# Patient Record
Sex: MALE | Race: WHITE | NOT HISPANIC OR LATINO | Employment: FULL TIME | URBAN - METROPOLITAN AREA
[De-identification: names, ages, dates, MRNs, and addresses within clinical notes are randomized per-mention and may not be internally consistent; named-entity substitution may affect disease eponyms.]

---

## 2022-09-02 ENCOUNTER — TELEPHONE (OUTPATIENT)
Dept: CARDIOLOGY | Facility: MEDICAL CENTER | Age: 62
End: 2022-09-02
Payer: COMMERCIAL

## 2022-09-09 ENCOUNTER — OFFICE VISIT (OUTPATIENT)
Dept: CARDIOLOGY | Facility: MEDICAL CENTER | Age: 62
End: 2022-09-09
Payer: COMMERCIAL

## 2022-09-09 VITALS
BODY MASS INDEX: 30.24 KG/M2 | RESPIRATION RATE: 16 BRPM | WEIGHT: 216 LBS | HEIGHT: 71 IN | OXYGEN SATURATION: 96 % | SYSTOLIC BLOOD PRESSURE: 110 MMHG | DIASTOLIC BLOOD PRESSURE: 70 MMHG | HEART RATE: 59 BPM

## 2022-09-09 DIAGNOSIS — U07.1 COVID-19: ICD-10-CM

## 2022-09-09 DIAGNOSIS — R06.02 SHORTNESS OF BREATH: ICD-10-CM

## 2022-09-09 DIAGNOSIS — E78.5 DYSLIPIDEMIA: ICD-10-CM

## 2022-09-09 DIAGNOSIS — R94.31 NONSPECIFIC ABNORMAL ELECTROCARDIOGRAM (ECG) (EKG): ICD-10-CM

## 2022-09-09 DIAGNOSIS — I20.9 ANGINA PECTORIS (HCC): ICD-10-CM

## 2022-09-09 LAB — EKG IMPRESSION: NORMAL

## 2022-09-09 PROCEDURE — 93000 ELECTROCARDIOGRAM COMPLETE: CPT | Performed by: STUDENT IN AN ORGANIZED HEALTH CARE EDUCATION/TRAINING PROGRAM

## 2022-09-09 PROCEDURE — 99204 OFFICE O/P NEW MOD 45 MIN: CPT | Performed by: STUDENT IN AN ORGANIZED HEALTH CARE EDUCATION/TRAINING PROGRAM

## 2022-09-09 RX ORDER — OMEPRAZOLE 20 MG/1
20 CAPSULE, DELAYED RELEASE ORAL DAILY
COMMUNITY

## 2022-09-09 RX ORDER — AMOXICILLIN AND CLAVULANATE POTASSIUM 875; 125 MG/1; MG/1
1 TABLET, FILM COATED ORAL 2 TIMES DAILY
COMMUNITY
Start: 2022-07-29

## 2022-09-09 RX ORDER — POLYMYXIN B SULFATE AND TRIMETHOPRIM 1; 10000 MG/ML; [USP'U]/ML
SOLUTION OPHTHALMIC
COMMUNITY
Start: 2022-08-19

## 2022-09-09 ASSESSMENT — ENCOUNTER SYMPTOMS
FOCAL WEAKNESS: 0
DIARRHEA: 0
NIGHT SWEATS: 0
DIZZINESS: 0
ABDOMINAL PAIN: 0
ORTHOPNEA: 0
WEAKNESS: 0
SYNCOPE: 0
COUGH: 0
NEAR-SYNCOPE: 0
PND: 0
DYSPNEA ON EXERTION: 0
PALPITATIONS: 0
FEVER: 0
SHORTNESS OF BREATH: 0
WHEEZING: 0
IRREGULAR HEARTBEAT: 0
NAUSEA: 0
VOMITING: 0

## 2022-09-09 NOTE — PROGRESS NOTES
Cardiology Initial Consultation Note    Date of note:    9/9/2022    Primary Care Provider: Mil Emerson M.D.  Referring Provider: Mil Emerson M.D.     Patient Name: Jeferson Beth   YOB: 1960  MRN:              [id number]    Chief Complaint: Chest pain and shortness of breath    History of Present Illness: Mr. Jeferson Beth is a 62 y.o. male with no prior cardiac history and history of COVID 10/2022 who is here for cardiac consultation for chest pain and shortness of breath.    The patient was evaluated by primary care physician on 6/22/2022.  Due to complaints of chest pain, the patient was referred to cardiology for further work-up.    The patient presents today to discuss symptoms.  The patient presents with his wife and 8-month-old grandson.  Since his COVID infection, he has been having chest pain and shortness of breath. By the afternoon time, he feels fatigued at work (he works as a ).  The patient reports that slowly over the last few months, he has been feeling better in terms of symptoms.  He denies any orthopnea, PND, or leg swelling.  No palpitations.  No syncope or presyncopal episodes.    Cardiovascular Risk Factors:  1. Smoking status: Never smoker  2. Type II Diabetes Mellitus: None/not recently checked  3. Hypertension: None  4. Dyslipidemia: None/not recently checked  5. Family history of early Coronary Artery Disease in a first degree relative (Male less than 55 years of age; Female less than 65 years of age): None  6.  Obesity and/or Metabolic Syndrome: Body mass index is 30.13 kg/m².  7. Sedentary lifestyle: Active ()    Review of Systems   Constitutional: Negative for fever, malaise/fatigue and night sweats.   Cardiovascular:  Negative for chest pain, dyspnea on exertion, irregular heartbeat, leg swelling, near-syncope, orthopnea, palpitations, paroxysmal nocturnal dyspnea and syncope.   Respiratory:  Negative for cough, shortness of breath and  "wheezing.    Gastrointestinal:  Negative for abdominal pain, diarrhea, nausea and vomiting.   Neurological:  Negative for dizziness, focal weakness and weakness.       All other systems reviewed and are negative.       Current Outpatient Medications   Medication Sig Dispense Refill    omeprazole (PRILOSEC) 20 MG delayed-release capsule Take 20 mg by mouth every day.      Aspirin 325 MG Cap Take  by mouth.      amoxicillin-clavulanate (AUGMENTIN) 875-125 MG Tab Take 1 Tablet by mouth 2 times a day.      polymixin-trimethoprim (POLYTRIM) 27462-5.1 UNIT/ML-% Solution        No current facility-administered medications for this visit.         Allergies   Allergen Reactions    Nkda [No Known Drug Allergy]          History reviewed. No pertinent family history.    Physical Exam:  Ambulatory Vitals  /70 (BP Location: Left arm, Patient Position: Sitting, BP Cuff Size: Adult)   Pulse (!) 59   Resp 16   Ht 1.803 m (5' 11\")   Wt 98 kg (216 lb)   SpO2 96%    Oxygen Therapy:  Pulse Oximetry: 96 %  BP Readings from Last 4 Encounters:   09/09/22 110/70   12/23/12 133/84       Weight/BMI: Body mass index is 30.13 kg/m².  Wt Readings from Last 4 Encounters:   09/09/22 98 kg (216 lb)   12/23/12 90.7 kg (200 lb)       General: Well appearing and in no apparent distress  Eyes: nl conjunctiva, no icteric sclera  ENT: wearing a mask, normal external appearance of ears  Neck: no visible JVP,  no carotid bruits  Lungs: normal respiratory effort, CTAB  Heart: RRR, no murmurs, no rubs or gallops,  no edema bilateral lower extremities. No LV/RV heave on cardiac palpatation. + bilateral radial pulses.  + bilateral dp pulses.   Abdomen: soft, non tender, non distended, no masses, normal bowel sounds.  No HSM.  Extremities/MSK: no clubbing, no cyanosis  Neurological: No focal sensory deficits  Psychiatric: Appropriate affect, A/O x 3, intact judgement and insight  Skin: Warm extremities      Lab Data Review:  No results found for: " CHOLSTRLTOT, LDL, HDL, TRIGLYCERIDE    No results found for: SODIUM, POTASSIUM, CHLORIDE, CO2, GLUCOSE, BUN, CREATININE, BUNCREATRAT, GLOMRATE  No results found for: ALKPHOSPHAT, ASTSGOT, ALTSGPT, TBILIRUBIN   No results found for: WBC  No results found for: HBA1C      Cardiac Imaging and Procedures Review:    EKG dated 9/9/2022: My personal interpretation is sinus bradycardia, low voltage  In extremity leads, poor R wave progression    No prior echocardiogram    Assessment & Plan     1. Angina pectoris (HCC)  EKG    [id number] PET W/CT ATTENUATION    EC-ECHOCARDIOGRAM COMPLETE W/O CONT      2. COVID-19  EKG    EC-ECHOCARDIOGRAM COMPLETE W/O CONT      3. Nonspecific abnormal electrocardiogram (ECG) (EKG)  EC-ECHOCARDIOGRAM COMPLETE W/O CONT      4. Shortness of breath  EKG            Shared Medical Decision Making:    Chest pain  Shortness of breath  -We will plan for cardiac PET to assess for any prior MI or reversible ischemia given symptoms as well as personal risk factors for cardiovascular disease.  Cardiac PET is ordered for diagnostic accuracy and low radiation dose for the patient.    Prior COVID infection  Abnormal EKG  -We will plan for echocardiogram given prior COVID infection and abnormal EKG to rule out any structural abnormalities of the heart.    Dyslipidemia  -Obtain labs from Dr. Emerson.  Reportedly had high total cholesterol but otherwise normal.  -Patient would like to work on diet first, will obtain records and calculate ASCVD.  If ASCVD risk high, will need to initiate statin.    All of the patient's excellent questions were answered to the best of my knowledge and to his satisfaction.  It was a pleasure seeing Mr. Jeferson Beth in my clinic today. Return in about 3 months (around 12/9/2022). Patient is aware to call the cardiology clinic with any questions or concerns.      Alma Mendez MD  [facility]  [facility]  [address]  [place] [address]  [place] [address]  Phone: [phone]  Fax: [phone]

## 2022-10-11 ENCOUNTER — TELEPHONE (OUTPATIENT)
Dept: CARDIOLOGY | Facility: MEDICAL CENTER | Age: 62
End: 2022-10-11
Payer: COMMERCIAL

## 2022-10-11 DIAGNOSIS — I20.9 ANGINA PECTORIS (HCC): ICD-10-CM

## 2022-10-11 DIAGNOSIS — R06.02 SHORTNESS OF BREATH: ICD-10-CM

## 2022-10-11 DIAGNOSIS — U07.1 COVID-19: ICD-10-CM

## 2022-10-11 DIAGNOSIS — R94.31 NONSPECIFIC ABNORMAL ELECTROCARDIOGRAM (ECG) (EKG): ICD-10-CM

## 2022-10-11 NOTE — TELEPHONE ENCOUNTER
Reviewed the denial letter in media from [facility] which is detailed in explaining why NM cardiac PET was denied. Per the letter, an abnormal echo may increase likelihood of coverage. See below. Pt is scheduled for echo 1/13.         If wanting to initiate an appeal here are the following phone numbers:   Phone number [phone], [phone]    To Dr. Mendez: should pt wait for echo results first, per insurance guidelines?

## 2022-10-11 NOTE — TELEPHONE ENCOUNTER
HK        Caller: [employer] [employer] [employer]    Topic/issue:  There was a denial letter for the Nuc med ref that was received. She was calling to see what would be the next step would be if it should be appealed or wait until the echo is completed in January next year    Callback Number: [phone]    Thank you    -Kedar GRANT

## 2022-10-12 NOTE — TELEPHONE ENCOUNTER
Alma Mendez M.D.  You 1 hour ago (8:34 AM)     Yes can do echo first, if you could change it to stat. Thank you!    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     Explained denial of stress test, she was aware, and the plan to schedule echo sooner. Provided wife the contact number for [facility] scheduling to arrange.     STAT echo order placed.

## 2022-12-02 ENCOUNTER — HOSPITAL ENCOUNTER (OUTPATIENT)
Dept: CARDIOLOGY | Facility: MEDICAL CENTER | Age: 62
End: 2022-12-02
Attending: STUDENT IN AN ORGANIZED HEALTH CARE EDUCATION/TRAINING PROGRAM
Payer: COMMERCIAL

## 2022-12-02 DIAGNOSIS — R94.31 NONSPECIFIC ABNORMAL ELECTROCARDIOGRAM (ECG) (EKG): ICD-10-CM

## 2022-12-02 DIAGNOSIS — R06.02 SHORTNESS OF BREATH: ICD-10-CM

## 2022-12-02 DIAGNOSIS — I20.9 ANGINA PECTORIS (HCC): ICD-10-CM

## 2022-12-02 DIAGNOSIS — U07.1 COVID-19: ICD-10-CM

## 2022-12-02 LAB — LV EJECT FRACT MOD 4C 99902: 73.6

## 2022-12-02 PROCEDURE — 93306 TTE W/DOPPLER COMPLETE: CPT | Mod: 26 | Performed by: STUDENT IN AN ORGANIZED HEALTH CARE EDUCATION/TRAINING PROGRAM

## 2022-12-02 PROCEDURE — 93306 TTE W/DOPPLER COMPLETE: CPT

## 2022-12-05 ENCOUNTER — TELEPHONE (OUTPATIENT)
Dept: CARDIOLOGY | Facility: MEDICAL CENTER | Age: 62
End: 2022-12-05
Payer: COMMERCIAL

## 2022-12-05 NOTE — TELEPHONE ENCOUNTER
----- Message from Consuelo Gutierrez R.N. sent at 12/5/2022  2:30 PM PST -----    ----- Message -----  From: Alma Mendez M.D.  Sent: 12/2/2022  10:25 AM PST  To: Consuelo Gutierrez R.N.    Hi,     Could you let the patient know his echo looks good - normal heart function.  He does have minor abnormalities such as mild leaky valve (aortic) and mildly dilated vessel (ascending aorta), which we'll need to monitor every 1-2 years via echocardiogram. Thank you!!    (He doesn't have MyChart!)    -HK

## 2022-12-05 NOTE — TELEPHONE ENCOUNTER
Patient called, [phone], and results given to patient's wife, Michelle. All questions/concerns answered at this time, patient's wife appreciative of information given and will pass on to patient.

## 2024-08-12 ENCOUNTER — HOSPITAL ENCOUNTER (OUTPATIENT)
Dept: LAB | Facility: MEDICAL CENTER | Age: 64
End: 2024-08-12
Attending: GENERAL PRACTICE
Payer: COMMERCIAL

## 2024-08-12 LAB
ALBUMIN SERPL BCP-MCNC: 4.3 G/DL (ref 3.2–4.9)
ALBUMIN/GLOB SERPL: 1.4 G/DL
ALP SERPL-CCNC: 40 U/L (ref 30–99)
ALT SERPL-CCNC: 31 U/L (ref 2–50)
ANION GAP SERPL CALC-SCNC: 12 MMOL/L (ref 7–16)
AST SERPL-CCNC: 25 U/L (ref 12–45)
BILIRUB SERPL-MCNC: 0.5 MG/DL (ref 0.1–1.5)
BUN SERPL-MCNC: 24 MG/DL (ref 8–22)
CALCIUM ALBUM COR SERPL-MCNC: 9 MG/DL (ref 8.5–10.5)
CALCIUM SERPL-MCNC: 9.2 MG/DL (ref 8.4–10.2)
CHLORIDE SERPL-SCNC: 108 MMOL/L (ref 96–112)
CHOLEST SERPL-MCNC: 256 MG/DL (ref 100–199)
CO2 SERPL-SCNC: 21 MMOL/L (ref 20–33)
CREAT SERPL-MCNC: 1.05 MG/DL (ref 0.5–1.4)
FASTING STATUS PATIENT QL REPORTED: NORMAL
GFR SERPLBLD CREATININE-BSD FMLA CKD-EPI: 79 ML/MIN/1.73 M 2
GLOBULIN SER CALC-MCNC: 3 G/DL (ref 1.9–3.5)
GLUCOSE SERPL-MCNC: 98 MG/DL (ref 65–99)
HDLC SERPL-MCNC: 55 MG/DL
LDLC SERPL CALC-MCNC: 179 MG/DL
POTASSIUM SERPL-SCNC: 4 MMOL/L (ref 3.6–5.5)
PROT SERPL-MCNC: 7.3 G/DL (ref 6–8.2)
PSA SERPL-MCNC: 0.56 NG/ML (ref 0–4)
SODIUM SERPL-SCNC: 141 MMOL/L (ref 135–145)
TRIGL SERPL-MCNC: 111 MG/DL (ref 0–149)

## 2024-08-12 PROCEDURE — 36415 COLL VENOUS BLD VENIPUNCTURE: CPT

## 2024-08-12 PROCEDURE — 84153 ASSAY OF PSA TOTAL: CPT

## 2024-08-12 PROCEDURE — 80053 COMPREHEN METABOLIC PANEL: CPT

## 2024-08-12 PROCEDURE — 80061 LIPID PANEL: CPT

## 2024-08-14 PROBLEM — G56.02 LEFT CARPAL TUNNEL SYNDROME: Status: ACTIVE | Noted: 2024-08-12

## 2024-08-21 ENCOUNTER — OFFICE VISIT (OUTPATIENT)
Dept: URGENT CARE | Facility: CLINIC | Age: 64
End: 2024-08-21
Payer: COMMERCIAL

## 2024-08-21 VITALS
RESPIRATION RATE: 19 BRPM | SYSTOLIC BLOOD PRESSURE: 126 MMHG | OXYGEN SATURATION: 99 % | TEMPERATURE: 98 F | DIASTOLIC BLOOD PRESSURE: 84 MMHG | HEART RATE: 66 BPM | BODY MASS INDEX: 29.4 KG/M2 | HEIGHT: 71 IN | WEIGHT: 210 LBS

## 2024-08-21 DIAGNOSIS — H61.23 BILATERAL HEARING LOSS DUE TO CERUMEN IMPACTION: ICD-10-CM

## 2024-08-21 PROCEDURE — 69210 REMOVE IMPACTED EAR WAX UNI: CPT

## 2024-08-21 ASSESSMENT — ENCOUNTER SYMPTOMS
EYE PAIN: 0
DIZZINESS: 0
FEVER: 0
ABDOMINAL PAIN: 0
MYALGIAS: 0
CHILLS: 0
SPUTUM PRODUCTION: 0
NAUSEA: 0
EYE DISCHARGE: 0
COUGH: 0
DIARRHEA: 0
SORE THROAT: 0
WHEEZING: 0
SHORTNESS OF BREATH: 0
EYE REDNESS: 0
VOMITING: 0
STRIDOR: 0
HEADACHES: 0
PALPITATIONS: 0

## 2024-08-21 NOTE — PROGRESS NOTES
Subjective:   Jeferson Beth is a 64 y.o. male who presents for Ear Fullness (wax)          I introduced myself to the patient and informed them that I am a Family Nurse Practitioner.    HPI:Jeferson is a 64-year-old male who comes in today c/o bilateral ear discomfort, scant drainage, and  diminished hearing. Onset was 7 days ago.  Patient describes symptoms as constant. They describe the pain as mild aching. Aggravating factors include touching the ear, lying on that side. Relieving factors include none. Treatments tried at home include none.  They describe their symptoms as moderate.  They deny any other viral type symptoms.  They deny fever, chills, nausea or vomiting, lethargy, mastoid pain or swelling.  Patient states he went to see an audiologist to get fitted for hearing aids, they told him to present to PCP or urgent care for ear lavage to clear out the wax before they could proceed with any further evaluation or treatment with hearing aids.    Review of Systems   Constitutional:  Negative for chills, fever and malaise/fatigue.   HENT:  Positive for hearing loss. Negative for congestion, ear pain and sore throat.    Eyes:  Negative for pain, discharge and redness.   Respiratory:  Negative for cough, sputum production, shortness of breath, wheezing and stridor.    Cardiovascular:  Negative for chest pain and palpitations.   Gastrointestinal:  Negative for abdominal pain, diarrhea, nausea and vomiting.   Genitourinary:  Negative for dysuria.   Musculoskeletal:  Negative for myalgias.   Skin:  Negative for rash.   Neurological:  Negative for dizziness and headaches.       Medications: amoxicillin-clavulanate Tabs  Aspirin Caps  ibuprofen Tabs  omeprazole  polymixin-trimethoprim Soln     Allergies: Nkda [no known drug allergy]    Problem List: does not have any pertinent problems on file.    Surgical History:  Past Surgical History:   Procedure Laterality Date    VENTRAL HERNIA REPAIR LAPAROSCOPIC  1/11/2010  "   Performed by [facility] at SURGERY [facility]    OTHER  1989    oral    OTHER ABDOMINAL SURGERY  4/79    appendectomy    OTHER ORTHOPEDIC SURGERY  4/79    l index finger       Past Social Hx:   reports that he has never smoked. He has never used smokeless tobacco. He reports current alcohol use. He reports that he does not use drugs.     Past Family Hx:   family history is not on file.     Problem list, medications, and allergies reviewed by myself today in Epic.   I have documented what I find to be significant in regards to past medical, social, family and surgical history  in my HPI or under PMH/PSH/FH review section, otherwise it is noncontributory     Objective:     /84   Pulse 66   Temp 36.7 °C (98 °F) (Temporal)   Resp 19   Ht 1.803 m (5' 11\")   Wt 95.3 kg (210 lb)   SpO2 99%   BMI 29.29 kg/m²     During this visit, appropriate PPE was worn, and hand hygiene was performed.    Physical Exam  Vitals reviewed.   Constitutional:       General: He is not in acute distress.     Appearance: Normal appearance. He is not ill-appearing or toxic-appearing.   HENT:      Head: Normocephalic and atraumatic.      Right Ear: External ear normal. There is impacted cerumen.      Left Ear: External ear normal. There is impacted cerumen.      Nose: No congestion or rhinorrhea.   Eyes:      General: No scleral icterus.        Right eye: No discharge.         Left eye: No discharge.      Extraocular Movements: Extraocular movements intact.      Conjunctiva/sclera: Conjunctivae normal.   Cardiovascular:      Rate and Rhythm: Normal rate.   Pulmonary:      Effort: Pulmonary effort is normal.   Abdominal:      General: There is no distension.      Palpations: Abdomen is soft.   Musculoskeletal:         General: Normal range of motion.      Cervical back: Normal range of motion and neck supple. No rigidity or tenderness.      Right lower leg: No edema.      Left lower leg: No edema.   Lymphadenopathy:     "  Cervical: No cervical adenopathy.   Skin:     General: Skin is warm and dry.      Capillary Refill: Capillary refill takes 2 to 3 seconds.      Coloration: Skin is not jaundiced.   Neurological:      General: No focal deficit present.      Mental Status: He is alert and oriented to person, place, and time. Mental status is at baseline.      Gait: Gait normal.   Psychiatric:         Mood and Affect: Mood normal.         Behavior: Behavior normal.         Thought Content: Thought content normal.         Judgment: Judgment normal.       Procedure - cerumen removal  After explaining procedure to patient and getting the verbal consent, I did attempt to remove cerumen from both ears with a lighted curette with fair result and removal of boluses of cerumen from bilateral ears.  Patient was able to tolerated fairly well at first but then complained of irritation and sensitivity.  Otoscopic exam revealed there was still a significant amount of cerumen present in bilateral ear canals.  Ear lavage was performed after instilling a softening agent.  This achieved good result, otoscopic exam following lavage revealed that bilateral ear canals were clear of cerumen, canals and TMs were normal with good landmarks and no signs of infection.       Assessment/Plan:     Diagnosis and associated orders:     1. Bilateral hearing loss due to cerumen impaction           Comments/MDM:     1. Bilateral hearing loss due to cerumen impaction  Cerumen removal as per procedure note above.  Patient did state he did get immediate relief of discomfort and slight improvement in his hearing.    Discussed with patient Dx,  DDx, management options (risks,benefits, and alternatives to planned treatment), natural progression.   Supportive care measures were discussed including periodic use of Debrox OTC  Questions were encouraged and answered.  Written information was provided and I did go over this with the patient in clinic today.  Instructed patient  regarding red flags and to return to urgent care prn if new or worsening sx or if there is no improvement in condition prn.    Advised the patient to follow-up with the primary care physician for recheck, reevaluation, and consideration of further management.  I did instruct patient regarding medications prescribed, purpose, side effects, precautions.  Instructed patient to get a pharmacy consult when picking up any prescribed medications.  Strict ER precautions discussed for any pain, redness, swelling of the ears or mastoid process especially in the setting of fever, chills, nausea or vomiting.   Patient states they have good understanding and they are agreeable with the plan of care.            Pt is clinically stable at today's acute urgent care visit. Vital signs are normal and reassuring.  No acute distress noted. Appropriate for outpatient management at this time.        I personally reviewed prior external notes and test results pertinent to today's visit.  I have independently reviewed and interpreted all diagnostics ordered during this urgent care acute visit.        Please note that this dictation was created using voice recognition software. I have made a reasonable attempt to correct obvious errors, but I expect that there are errors of grammar and possibly content that I did not discover before finalizing the note.    This note was electronically signed by Warren MERAZ, SOILA, DYLON, KASSIE